# Patient Record
(demographics unavailable — no encounter records)

---

## 2025-03-11 NOTE — PHYSICAL EXAM
[General Appearance - Well Developed] : well developed [Normal Appearance] : normal appearance [Well Groomed] : well groomed [General Appearance - Well Nourished] : well nourished [No Deformities] : no deformities [General Appearance - In No Acute Distress] : no acute distress [Normal Conjunctiva] : the conjunctiva exhibited no abnormalities [Eyelids - No Xanthelasma] : the eyelids demonstrated no xanthelasmas [Normal Oral Mucosa] : normal oral mucosa [No Oral Pallor] : no oral pallor [No Oral Cyanosis] : no oral cyanosis [Normal Jugular Venous A Waves Present] : normal jugular venous A waves present [Normal Jugular Venous V Waves Present] : normal jugular venous V waves present [No Jugular Venous Urena A Waves] : no jugular venous urena A waves [Respiration, Rhythm And Depth] : normal respiratory rhythm and effort [Exaggerated Use Of Accessory Muscles For Inspiration] : no accessory muscle use [Auscultation Breath Sounds / Voice Sounds] : lungs were clear to auscultation bilaterally [Abdomen Soft] : soft [Abdomen Tenderness] : non-tender [Abdomen Mass (___ Cm)] : no abdominal mass palpated [Abnormal Walk] : normal gait [Gait - Sufficient For Exercise Testing] : the gait was sufficient for exercise testing [Nail Clubbing] : no clubbing of the fingernails [Cyanosis, Localized] : no localized cyanosis [Petechial Hemorrhages (___cm)] : no petechial hemorrhages [Skin Color & Pigmentation] : normal skin color and pigmentation [] : no rash [No Venous Stasis] : no venous stasis [No Skin Ulcers] : no skin ulcer [Skin Lesions] : no skin lesions [No Xanthoma] : no  xanthoma was observed [Affect] : the affect was normal [Oriented To Time, Place, And Person] : oriented to person, place, and time [Mood] : the mood was normal [No Anxiety] : not feeling anxious [5th Left ICS - MCL] : palpated at the 5th LICS in the midclavicular line [Normal] : normal [No Precordial Heave] : no precordial heave was noted [Normal Rate] : normal [Heart Rate ___] : [unfilled] bpm [Rhythm Regular] : regular [Normal S1] : normal S1 [Normal S2] : normal S2 [No Gallop] : no gallop heard [II] : a grade 2 [2+] : left 2+ [No Abnormalities] : the abdominal aorta was not enlarged and no bruit was heard [No Pitting Edema] : no pitting edema present [Apical Thrill] : no thrill palpable at the apex [S3] : no S3 [S4] : no S4 [Click] : no click [Pericardial Rub] : no pericardial rub [Right Carotid Bruit] : no bruit heard over the right carotid [Left Carotid Bruit] : no bruit heard over the left carotid [Right Femoral Bruit] : no bruit heard over the right femoral artery [Left Femoral Bruit] : no bruit heard over the left femoral artery [Bruit] : no bruit heard [Rt] : no varicose veins of the right leg [Lt] : no varicose veins of the left leg

## 2025-03-11 NOTE — HISTORY OF PRESENT ILLNESS
[FreeTextEntry1] : Mel Renteria presented to the office for a followup evaluation.  She was last seen 6 months ago.  She is now 92years old, with AS, paroxysmal atrial fibrillation on coumadin, as well as a history of hypertension.  She has a history of hyperlipidemia, most recently started on atorvastatin.  At the time of her visit in 2022, she was feeling well overall.  We discussed the possibility of switching to a novel anticoagulant.  She wished to defer at that time.  Blood work subsequently revealed significant hyperlipidemia, for which atorvastatin seemed appropriate.  I saw her in May, 2024, feeling well.  We changed from warfarin over to Eliquis, which she has been tolerating well.  Recommended an echocardiogram which was not performed.  She was seen in the office in , and was feeling well at that time.  She presents to the office today, having been feeling well.  She does not report any chest discomfort with activity.  Her activities remain limited.  She had mild dyspnea with a longer walk last week. She denies orthopnea, PND and edema.  She denies palpitations, and syncope.  At some point recently, she felt an issue with lightheadedness, of uncertain etiology. She skipped one of her medications for a few days because she could not get it filled in time.  This problem has not recurred.  She moved to an assisted living facility, and is trying to get adjusted.  Past medical history is remarkable for paroxysmal atrial fibrillation, hypertension, rheumatoid arthritis, gastroesophageal reflux disease, and hyperlipidemia.  Social history is remarkable for stopping tobacco use many years ago. She lives in [place]. She has 2 children, one of whom lives in [place] and one lives in [place]. She has 2 daughters, 4 grandchildren, 2 great grandchildren  Family history is notable for mother  of stroke at age 79, father  of complications of Parkinson's disease at age 59. One brother  of a myocardial infarction.

## 2025-03-11 NOTE — DISCUSSION/SUMMARY
[FreeTextEntry1] :  appears well, maintaining sinus rhythm and without evidence of vascular disease on examination.  I reviewed her echocardiogram from April 30, 2021.  This revealed a normal ejection fraction, with mild aortic stenosis, though it did appear visually to be moderate. I reviewed her echo from August 24, 2022, which revealed a normal ejection fraction and moderate aortic stenosis.  Echocardiography was performed September 12, 2024.  This revealed an ejection fraction of 63%.  There was mild mitral regurgitation.  There was mild to moderate aortic stenosis with associated minimal aortic regurgitation.  I reviewed her most recent blood work, serial EKGs, and her INR measurements.  She will continue Eliquis.     We discussed her bradycardia, which is significant today, with a resting heart rate in the 40s.  We will start by reducing her dose of metoprolol succinate down to 25 mg daily.  It is not clear, but it is possible that her episode of lightheadedness sometime ago relates to her bradycardia.  I will have her return to the office in about 6 weeks to reassess her heart rates and blood pressures. [EKG obtained to assist in diagnosis and management of assessed problem(s)] : EKG obtained to assist in diagnosis and management of assessed problem(s)

## 2025-03-11 NOTE — REVIEW OF SYSTEMS
[Negative] : Heme/Lymph [SOB] : no shortness of breath [Dyspnea on exertion] : not dyspnea during exertion [Leg Claudication] : no intermittent leg claudication [Palpitations] : no palpitations [Syncope] : no syncope [Cough] : no cough [Wheezing] : no wheezing

## 2025-06-09 NOTE — DISCUSSION/SUMMARY
[EKG obtained to assist in diagnosis and management of assessed problem(s)] : EKG obtained to assist in diagnosis and management of assessed problem(s) [FreeTextEntry1] :  appears well, maintaining sinus rhythm and without evidence of vascular disease on examination.  I reviewed her echocardiogram from April 30, 2021.  This revealed a normal ejection fraction, with mild aortic stenosis, though it did appear visually to be moderate. I reviewed her echo from August 24, 2022, which revealed a normal ejection fraction and moderate aortic stenosis.  Echocardiography was performed September 12, 2024.  This revealed an ejection fraction of 63%.  There was mild mitral regurgitation.  There was mild to moderate aortic stenosis with associated minimal aortic regurgitation.  I reviewed her most recent blood work, serial EKGs, and her INR measurements.  She is now off Eliquis.  We had an extensive conversation about the risks of bleeding versus the risk of stroke.  Her initial inclination is to remain off anticoagulation.  While that is not unreasonable, the risk of stroke will be elevated cumulatively.  For now, we have agreed to reconvene on this matter in about 2 months and establish a more long-term plan taking her risk of bleeding, and her advanced age, into account.  She will start checking her blood pressure regularly.  She will send me a list of blood pressures after about 2 weeks.

## 2025-06-09 NOTE — HISTORY OF PRESENT ILLNESS
[FreeTextEntry1] : Mel Renteria presented to the office for a followup evaluation.  She was last seen 3 months ago.  She is now 93 years old, with AS, paroxysmal atrial fibrillation on coumadin, as well as a history of hypertension.  She has a history of hyperlipidemia, most recently started on atorvastatin.  At the time of her visit in 2022, she was feeling well overall.  We discussed the possibility of switching to a novel anticoagulant.  She wished to defer at that time.  Blood work subsequently revealed significant hyperlipidemia, for which atorvastatin seemed appropriate.  I saw her in May, 2024, feeling well.  We changed from warfarin over to Eliquis, which she has been tolerating well.  Recommended an echocardiogram which was not performed.  She was seen in the office in , and was feeling well at that time.  She was evaluated in , at which time she was noted to be bradycardic, with a resting heart rate in the 40s.  We reduced metoprolol down to 25 mg daily.  She was admitted to [facility] between  and May 2, 2025, with a GI bleed.  She had been recently admitted to the hospital with an incarcerated hernia, for which she had surgery on 2025. She was sent home without having had a BM, and she was constipated for a week.  She was prescribed a laxative, and developed black stool.  She was transferred from [facility] to the [place] ICU after being admitted with melena, found to be secondary to an actively bleeding duodenal pseudoaneurysm.  She was transfused 4 units of blood.  Unfortunately she remained unstable, and was transferred to [facility] where she underwent gastroduodenal artery embolization on 2025.  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  She was ultimately discharged off anticoagulation with consideration to resume Eliquis or consider occlusion of her left atrial appendage as an outpatient.    She presents to the office today, having been feeling well.  She does not report any chest discomfort with activity.  Her activities remain limited.  She denies orthopnea, PND and edema.  She denies palpitations, and syncope.   She moved to [facility], and is trying to get adjusted.  Her blood pressure at rehab was noted to be elevated, but no changes to her medications were made at that time.  Past medical history is remarkable for paroxysmal atrial fibrillation, hypertension, rheumatoid arthritis, gastroesophageal reflux disease, and hyperlipidemia.  Social history is remarkable for stopping tobacco use many years ago. She lives in [place]. She has 2 children, one of whom lives in [place] and one lives in [place]. She has 2 daughters, 4 grandchildren, 2 great grandchildren  Family history is notable for mother  of stroke at age 79, father  of complications of Parkinson's disease at age 59. One brother  of a myocardial infarction.

## 2025-06-09 NOTE — PHYSICAL EXAM
[General Appearance - Well Developed] : well developed [Normal Appearance] : normal appearance [Well Groomed] : well groomed [General Appearance - Well Nourished] : well nourished [No Deformities] : no deformities [General Appearance - In No Acute Distress] : no acute distress [Normal Conjunctiva] : the conjunctiva exhibited no abnormalities [Eyelids - No Xanthelasma] : the eyelids demonstrated no xanthelasmas [Normal Oral Mucosa] : normal oral mucosa [No Oral Pallor] : no oral pallor [No Oral Cyanosis] : no oral cyanosis [Normal Jugular Venous V Waves Present] : normal jugular venous V waves present [Normal Jugular Venous A Waves Present] : normal jugular venous A waves present [No Jugular Venous Urena A Waves] : no jugular venous urena A waves [Respiration, Rhythm And Depth] : normal respiratory rhythm and effort [Auscultation Breath Sounds / Voice Sounds] : lungs were clear to auscultation bilaterally [Exaggerated Use Of Accessory Muscles For Inspiration] : no accessory muscle use [Abdomen Soft] : soft [Abdomen Tenderness] : non-tender [Abdomen Mass (___ Cm)] : no abdominal mass palpated [Abnormal Walk] : normal gait [Gait - Sufficient For Exercise Testing] : the gait was sufficient for exercise testing [Nail Clubbing] : no clubbing of the fingernails [Cyanosis, Localized] : no localized cyanosis [Petechial Hemorrhages (___cm)] : no petechial hemorrhages [Skin Color & Pigmentation] : normal skin color and pigmentation [] : no rash [No Venous Stasis] : no venous stasis [Skin Lesions] : no skin lesions [No Skin Ulcers] : no skin ulcer [No Xanthoma] : no  xanthoma was observed [Affect] : the affect was normal [Oriented To Time, Place, And Person] : oriented to person, place, and time [Mood] : the mood was normal [No Anxiety] : not feeling anxious [5th Left ICS - MCL] : palpated at the 5th LICS in the midclavicular line [Normal] : normal [No Precordial Heave] : no precordial heave was noted [Heart Rate ___] : [unfilled] bpm [Normal Rate] : normal [Rhythm Regular] : regular [Normal S1] : normal S1 [Normal S2] : normal S2 [No Gallop] : no gallop heard [II] : a grade 2 [2+] : left 2+ [No Abnormalities] : the abdominal aorta was not enlarged and no bruit was heard [No Pitting Edema] : no pitting edema present [Apical Thrill] : no thrill palpable at the apex [S3] : no S3 [S4] : no S4 [Click] : no click [Pericardial Rub] : no pericardial rub [Right Carotid Bruit] : no bruit heard over the right carotid [Left Carotid Bruit] : no bruit heard over the left carotid [Right Femoral Bruit] : no bruit heard over the right femoral artery [Left Femoral Bruit] : no bruit heard over the left femoral artery [Bruit] : no bruit heard [Rt] : no varicose veins of the right leg [Lt] : no varicose veins of the left leg

## 2025-07-18 NOTE — HISTORY OF PRESENT ILLNESS
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 S/p cancer removed from right cheek about 3 years ago Here for surgical consultation.  States at first it was painful, but after the biopsy it is no longer painful.  No history of chemo and radiation.

## 2025-07-18 NOTE — REASON FOR VISIT
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